# Patient Record
Sex: FEMALE | Employment: OTHER | URBAN - METROPOLITAN AREA
[De-identification: names, ages, dates, MRNs, and addresses within clinical notes are randomized per-mention and may not be internally consistent; named-entity substitution may affect disease eponyms.]

---

## 2018-02-20 ENCOUNTER — CONSULTATION (OUTPATIENT)
Dept: URBAN - METROPOLITAN AREA CLINIC 11 | Facility: CLINIC | Age: 78
End: 2018-02-20

## 2018-02-20 VITALS — SYSTOLIC BLOOD PRESSURE: 164 MMHG | DIASTOLIC BLOOD PRESSURE: 82 MMHG | HEIGHT: 55 IN

## 2018-02-20 ASSESSMENT — TONOMETRY
OS_IOP_MMHG: 17
OD_IOP_MMHG: 16

## 2018-02-20 ASSESSMENT — VISUAL ACUITY
OD_CC: 20/30-2
OS_CC: 20/30-2

## 2020-11-30 NOTE — PATIENT DISCUSSION
Patient has been using a product called lilash to enhance lashes.    Advised patient that lash product she is using daily daily could be contributing a lid inflammation.    Advised patient to try using the product QOD and see if improvement in symptoms.

## 2021-12-08 NOTE — PATIENT DISCUSSION
Discussed lid hygiene, warm compress and eyelid wash. · At this time non-palapable PT pulses.  CFT good. Skin atrophic.  Will monitor for vascular testing at subsequent visits.  Changes will prompt testing.